# Patient Record
Sex: FEMALE | Race: WHITE
[De-identification: names, ages, dates, MRNs, and addresses within clinical notes are randomized per-mention and may not be internally consistent; named-entity substitution may affect disease eponyms.]

---

## 2018-09-04 ENCOUNTER — HOSPITAL ENCOUNTER (OUTPATIENT)
Dept: HOSPITAL 84 - D.CATH | Age: 63
Discharge: HOME | End: 2018-09-04
Attending: INTERNAL MEDICINE
Payer: MEDICARE

## 2018-09-04 VITALS — HEIGHT: 67 IN | WEIGHT: 206.43 LBS | BODY MASS INDEX: 32.4 KG/M2

## 2018-09-04 VITALS — DIASTOLIC BLOOD PRESSURE: 79 MMHG | SYSTOLIC BLOOD PRESSURE: 150 MMHG

## 2018-09-04 DIAGNOSIS — I20.9: Primary | ICD-10-CM

## 2018-09-04 DIAGNOSIS — Z01.812: ICD-10-CM

## 2018-09-04 LAB
ANION GAP SERPL CALC-SCNC: 14.3 MMOL/L (ref 8–16)
BASOPHILS NFR BLD AUTO: 0.2 % (ref 0–2)
BUN SERPL-MCNC: 28 MG/DL (ref 7–18)
CALCIUM SERPL-MCNC: 8.7 MG/DL (ref 8.5–10.1)
CHLORIDE SERPL-SCNC: 104 MMOL/L (ref 98–107)
CO2 SERPL-SCNC: 23.4 MMOL/L (ref 21–32)
CREAT SERPL-MCNC: 1 MG/DL (ref 0.6–1.3)
EOSINOPHIL NFR BLD: 1.3 % (ref 0–7)
ERYTHROCYTE [DISTWIDTH] IN BLOOD BY AUTOMATED COUNT: 12.6 % (ref 11.5–14.5)
GLUCOSE SERPL-MCNC: 112 MG/DL (ref 74–106)
HCT VFR BLD CALC: 42.8 % (ref 36–48)
HGB BLD-MCNC: 14.7 G/DL (ref 12–16)
IMM GRANULOCYTES NFR BLD: 0.2 % (ref 0–5)
LYMPHOCYTES NFR BLD AUTO: 27.9 % (ref 15–50)
MCH RBC QN AUTO: 30.9 PG (ref 26–34)
MCHC RBC AUTO-ENTMCNC: 34.3 G/DL (ref 31–37)
MCV RBC: 89.9 FL (ref 80–100)
MONOCYTES NFR BLD: 5 % (ref 2–11)
NEUTROPHILS NFR BLD AUTO: 65.4 % (ref 40–80)
OSMOLALITY SERPL CALC.SUM OF ELEC: 282 MOSM/KG (ref 275–300)
PLATELET # BLD: 246 10X3/UL (ref 130–400)
PMV BLD AUTO: 10.1 FL (ref 7.4–10.4)
POTASSIUM SERPL-SCNC: 3.7 MMOL/L (ref 3.5–5.1)
RBC # BLD AUTO: 4.76 10X6/UL (ref 4–5.4)
SODIUM SERPL-SCNC: 138 MMOL/L (ref 136–145)
WBC # BLD AUTO: 9.2 10X3/UL (ref 4.8–10.8)

## 2020-09-10 ENCOUNTER — HOSPITAL ENCOUNTER (OUTPATIENT)
Dept: HOSPITAL 84 - D.HCCECHO | Age: 65
Discharge: HOME | End: 2020-09-10
Attending: INTERNAL MEDICINE
Payer: MEDICARE

## 2020-09-10 VITALS — BODY MASS INDEX: 32.3 KG/M2

## 2020-09-10 DIAGNOSIS — I20.9: Primary | ICD-10-CM

## 2020-09-12 NOTE — EC
PATIENT:ZHAO MILLER                 DATE OF SERVICE: 09/10/20
SEX: F                                  MEDICAL RECORD: [id number]
DATE OF BIRTH: [birth year]                        LOCATION:D.Formerly [facility] [place]          
AGE OF PATIENT: 64                             ADMISSION DATE: 09/10/20
 
REFERRING PHYSICIAN:                               
 
INTERPRETING PHYSICIAN: SOFIA ADKINS MD          
 
 
 
                             ECHOCARDIOGRAM REPORT
  ECHO CHARGES 4               ECHO COMPLETE                 Date: 09/10/20
 
 
 
CLINICAL DIAGNOSIS: ANGINA/SYNCOPE/ HX OF MVP     
 
                         ECHOCARDIOGRAPHIC MEASUREMENTS
      (adult normal given)
   AC root (d.<3.7cm) 3.2  cm   LV Septum d (<1.2 cm> 1.2  cm
      Valve Excursion 1.4  cm     LV Septum (systole) 1.4  cm
Left Atria (s.<4.0cm> 3.3  cm          LVPW d(<1.2cm) 1.3  cm
        RV (d.<2.3cm) 3.7  cm           LVPW (sytole) 1.6  cm
  LV diastole(<5.6CM) 4.4  cm       MV E-F(>70mm/sec)      cm
           LV systole 3.4  cm           LVOT Diameter 1.7  cm
       MV exc.(>10mm) 1.1  cm
Est.ejection fraction (50-75%)     %
 
   DOPPLER:
     LVIT      cm/sec A 101.0cm/sec E 82.0  cm/sec
       LA      cm/sec      RVSP 32   mmHg
     LVOT 114  cm/sec   AOP1/2T      m/s
  Asc. Ao 134  cm/sec
     RVOT 88   cm/sec
       RA      cm/sec
         cm/sec
 AV Gradient Peak 7.21 mmHg  AV Mean 3.72 mmHg  AV Area 2.0  cm
 MV Gradient Peak 6.49 mmHg  MV Mean 2.51 mmHg  MV Area      cm
   COMMENTS:                                              
 
 
 Cardiac Sonographer: 2               SYLVIA RAMIREZ              
      Cardiologist: 3          Dr. Gage             
             TAPE# PACS           
                                       Pericardial Effusion N                        
 
 
DATE OF SERVICE:  
 
Adequate 2D, color-flow imaging, spectral Doppler, and M-Mode
 
Borderline LVH.  LV internal dimension is normal.  Wall motion is normal.  EF is
greater than or equal to 55%.  Aortic valve is tricuspid.  No evidence of
stenosis by Doppler interrogation.  Left atrium is normal.  Mitral valve shows
some redundancy, but no true prolapse.  Trace MR.  Right-sided chambers are
grossly normal.  Mild TR.
 
 
 
 
ECHOCARDIOGRAM REPORT                          [id number]    ZHAO MILLER         
 
 
TRANSINT:[id number] Voice Confirmation ID: [id number] DOCUMENT ID: [id number]
                                           
                                           SOFIA ADKINS MD          
 
 
 
Electronically Signed by SOFIA ADKINS on 09/12/20 at 1100
 
 
 
 
 
 
 
 
 
 
 
 
 
 
 
 
 
 
 
 
 
 
 
 
 
 
 
 
 
 
 
 
 
 
 
 
 
 
 
CC:                                                             [id number]
DICTATION DATE: 09/11/20 0920     :     09/11/20 1342      DEP CLI 
                                                                      09/10/20
[place] [id number] [place] [phone]

## 2021-05-06 ENCOUNTER — HOSPITAL ENCOUNTER (OUTPATIENT)
Dept: HOSPITAL 84 - D.MAMMO | Age: 66
Discharge: HOME | End: 2021-05-06
Attending: GENERAL PRACTICE
Payer: MEDICARE

## 2021-05-06 VITALS — BODY MASS INDEX: 32.3 KG/M2

## 2021-05-06 DIAGNOSIS — Z12.31: Primary | ICD-10-CM
